# Patient Record
Sex: FEMALE | Race: WHITE | NOT HISPANIC OR LATINO | Employment: OTHER | URBAN - METROPOLITAN AREA
[De-identification: names, ages, dates, MRNs, and addresses within clinical notes are randomized per-mention and may not be internally consistent; named-entity substitution may affect disease eponyms.]

---

## 2017-06-29 ENCOUNTER — HISTORICAL (OUTPATIENT)
Dept: RADIOLOGY | Facility: HOSPITAL | Age: 77
End: 2017-06-29

## 2017-07-12 ENCOUNTER — HISTORICAL (OUTPATIENT)
Dept: RADIOLOGY | Facility: HOSPITAL | Age: 77
End: 2017-07-12

## 2017-07-13 ENCOUNTER — HISTORICAL (OUTPATIENT)
Dept: RADIOLOGY | Facility: HOSPITAL | Age: 77
End: 2017-07-13

## 2017-10-09 ENCOUNTER — HISTORICAL (OUTPATIENT)
Dept: RADIOLOGY | Facility: HOSPITAL | Age: 77
End: 2017-10-09

## 2017-11-02 ENCOUNTER — HISTORICAL (OUTPATIENT)
Dept: INFUSION THERAPY | Facility: HOSPITAL | Age: 77
End: 2017-11-02

## 2019-05-01 ENCOUNTER — HISTORICAL (OUTPATIENT)
Dept: RADIOLOGY | Facility: HOSPITAL | Age: 79
End: 2019-05-01

## 2022-04-11 ENCOUNTER — HISTORICAL (OUTPATIENT)
Dept: ADMINISTRATIVE | Facility: HOSPITAL | Age: 82
End: 2022-04-11

## 2022-04-28 VITALS
DIASTOLIC BLOOD PRESSURE: 70 MMHG | BODY MASS INDEX: 31.04 KG/M2 | OXYGEN SATURATION: 97 % | HEIGHT: 65 IN | SYSTOLIC BLOOD PRESSURE: 105 MMHG | WEIGHT: 186.31 LBS

## 2024-03-19 ENCOUNTER — LAB VISIT (OUTPATIENT)
Dept: LAB | Facility: HOSPITAL | Age: 84
End: 2024-03-19
Attending: INTERNAL MEDICINE
Payer: MEDICARE

## 2024-03-19 DIAGNOSIS — J44.9 COPD (CHRONIC OBSTRUCTIVE PULMONARY DISEASE): Primary | ICD-10-CM

## 2024-03-19 LAB
ALLENS TEST: ABNORMAL
DELSYS: ABNORMAL
HCO3 UR-SCNC: 24.4 MMOL/L (ref 24–28)
PCO2 BLDA: 40.4 MMHG (ref 35–45)
PH SMN: 7.39 [PH] (ref 7.35–7.45)
PO2 BLDA: 113 MMHG (ref 80–100)
POC BE: -1 MMOL/L
POC SATURATED O2: 98 % (ref 95–100)
POC TCO2: 26 MMOL/L (ref 23–27)
SAMPLE: ABNORMAL
SITE: ABNORMAL

## 2024-03-19 PROCEDURE — 36600 WITHDRAWAL OF ARTERIAL BLOOD: CPT

## 2024-03-19 PROCEDURE — 82803 BLOOD GASES ANY COMBINATION: CPT

## 2024-03-19 PROCEDURE — 99900035 HC TECH TIME PER 15 MIN (STAT)

## 2024-05-21 ENCOUNTER — HOSPITAL ENCOUNTER (EMERGENCY)
Facility: HOSPITAL | Age: 84
Discharge: HOME | End: 2024-05-21
Attending: INTERNAL MEDICINE
Payer: MEDICARE

## 2024-05-21 VITALS
TEMPERATURE: 98 F | DIASTOLIC BLOOD PRESSURE: 79 MMHG | HEART RATE: 56 BPM | HEIGHT: 65 IN | RESPIRATION RATE: 16 BRPM | SYSTOLIC BLOOD PRESSURE: 128 MMHG | BODY MASS INDEX: 29.99 KG/M2 | WEIGHT: 180 LBS | OXYGEN SATURATION: 98 %

## 2024-05-21 DIAGNOSIS — R07.9 CHEST PAIN, UNSPECIFIED TYPE: Primary | ICD-10-CM

## 2024-05-21 LAB
ALBUMIN SERPL-MCNC: 3.3 G/DL (ref 3.4–4.8)
ALBUMIN/GLOB SERPL: 0.8 RATIO (ref 1.1–2)
ALP SERPL-CCNC: 75 UNIT/L (ref 40–150)
ALT SERPL-CCNC: 7 UNIT/L (ref 0–55)
ANION GAP SERPL CALC-SCNC: 6 MEQ/L
AST SERPL-CCNC: 12 UNIT/L (ref 5–34)
BASOPHILS # BLD AUTO: 0.07 X10(3)/MCL
BASOPHILS NFR BLD AUTO: 1 %
BILIRUB SERPL-MCNC: 0.2 MG/DL
BNP BLD-MCNC: 133.3 PG/ML
BUN SERPL-MCNC: 14 MG/DL (ref 9.8–20.1)
CALCIUM SERPL-MCNC: 9.4 MG/DL (ref 8.4–10.2)
CHLORIDE SERPL-SCNC: 107 MMOL/L (ref 98–107)
CO2 SERPL-SCNC: 28 MMOL/L (ref 23–31)
CREAT SERPL-MCNC: 1.03 MG/DL (ref 0.55–1.02)
CREAT/UREA NIT SERPL: 14
EOSINOPHIL # BLD AUTO: 0.04 X10(3)/MCL (ref 0–0.9)
EOSINOPHIL NFR BLD AUTO: 0.6 %
ERYTHROCYTE [DISTWIDTH] IN BLOOD BY AUTOMATED COUNT: 13 % (ref 11.5–17)
GFR SERPLBLD CREATININE-BSD FMLA CKD-EPI: 55 ML/MIN/1.73/M2
GLOBULIN SER-MCNC: 4.2 GM/DL (ref 2.4–3.5)
GLUCOSE SERPL-MCNC: 115 MG/DL (ref 82–115)
HCT VFR BLD AUTO: 42.6 % (ref 37–47)
HGB BLD-MCNC: 13 G/DL (ref 12–16)
IMM GRANULOCYTES # BLD AUTO: 0.03 X10(3)/MCL (ref 0–0.04)
IMM GRANULOCYTES NFR BLD AUTO: 0.4 %
LYMPHOCYTES # BLD AUTO: 1.33 X10(3)/MCL (ref 0.6–4.6)
LYMPHOCYTES NFR BLD AUTO: 19.3 %
MCH RBC QN AUTO: 27.2 PG (ref 27–31)
MCHC RBC AUTO-ENTMCNC: 30.5 G/DL (ref 33–36)
MCV RBC AUTO: 89.1 FL (ref 80–94)
MONOCYTES # BLD AUTO: 0.63 X10(3)/MCL (ref 0.1–1.3)
MONOCYTES NFR BLD AUTO: 9.1 %
NEUTROPHILS # BLD AUTO: 4.79 X10(3)/MCL (ref 2.1–9.2)
NEUTROPHILS NFR BLD AUTO: 69.6 %
PLATELET # BLD AUTO: 282 X10(3)/MCL (ref 130–400)
PMV BLD AUTO: 12.1 FL (ref 7.4–10.4)
POTASSIUM SERPL-SCNC: 4.7 MMOL/L (ref 3.5–5.1)
PROT SERPL-MCNC: 7.5 GM/DL (ref 5.8–7.6)
RBC # BLD AUTO: 4.78 X10(6)/MCL (ref 4.2–5.4)
SODIUM SERPL-SCNC: 141 MMOL/L (ref 136–145)
TROPONIN I SERPL-MCNC: 0.01 NG/ML (ref 0–0.04)
WBC # SPEC AUTO: 6.89 X10(3)/MCL (ref 4.5–11.5)

## 2024-05-21 PROCEDURE — 93010 ELECTROCARDIOGRAM REPORT: CPT | Mod: ,,, | Performed by: INTERNAL MEDICINE

## 2024-05-21 PROCEDURE — 85025 COMPLETE CBC W/AUTO DIFF WBC: CPT | Performed by: INTERNAL MEDICINE

## 2024-05-21 PROCEDURE — 83880 ASSAY OF NATRIURETIC PEPTIDE: CPT | Performed by: INTERNAL MEDICINE

## 2024-05-21 PROCEDURE — 93005 ELECTROCARDIOGRAM TRACING: CPT

## 2024-05-21 PROCEDURE — 99285 EMERGENCY DEPT VISIT HI MDM: CPT | Mod: 25

## 2024-05-21 PROCEDURE — 80053 COMPREHEN METABOLIC PANEL: CPT | Performed by: INTERNAL MEDICINE

## 2024-05-21 PROCEDURE — 84484 ASSAY OF TROPONIN QUANT: CPT | Performed by: INTERNAL MEDICINE

## 2024-05-21 NOTE — ED PROVIDER NOTES
Encounter Date: 5/21/2024  History from patient     History     Chief Complaint   Patient presents with    Chest Pain     Chest pain that started at 11am this morning. Pt reports she took 2 SL nitro at home     KELLEE Dupont is 83 y.o. female who  has a past medical history of COPD (chronic obstructive pulmonary disease), GERD (gastroesophageal reflux disease), Hypercholesterolemia, Hypertension, Pulmonary hypertension, and Venous stasis. arrives in ER with c/o Chest Pain (Chest pain that started at 11am this morning. Pt reports she took 2 SL nitro at home)    Review of patient's allergies indicates:  No Known Allergies  Past Medical History:   Diagnosis Date    COPD (chronic obstructive pulmonary disease)     GERD (gastroesophageal reflux disease)     Hypercholesterolemia     Hypertension     Pulmonary hypertension     Venous stasis      Past Surgical History:   Procedure Laterality Date    CHOLECYSTECTOMY      HYSTERECTOMY      KNEE SURGERY       Family History   Problem Relation Name Age of Onset    Stroke Father      COPD Father      Valvular heart disease Father        Review of Systems   Constitutional:  Negative for fever.   HENT:  Negative for trouble swallowing and voice change.    Eyes:  Negative for visual disturbance.   Respiratory:  Negative for cough and shortness of breath.    Cardiovascular:  Positive for chest pain.        Off and on since morning, has had it in past also   Gastrointestinal:  Negative for abdominal pain, diarrhea and vomiting.   Genitourinary:  Negative for dysuria and hematuria.   Musculoskeletal:  Negative for back pain and gait problem.   Skin:  Negative for color change and rash.   Neurological:  Positive for dizziness. Negative for headaches.   Psychiatric/Behavioral:  Negative for behavioral problems and sleep disturbance.    All other systems reviewed and are negative.    Physical Exam     Initial Vitals [05/21/24 1455]   BP Pulse Resp Temp SpO2   (!) 158/66 63 20  97.2 °F (36.2 °C) 98 %      MAP       --         Physical Exam    Nursing note and vitals reviewed.  Constitutional: She appears well-developed and well-nourished. No distress.   HENT:   Head: Normocephalic and atraumatic.   Eyes: EOM are normal.   Neck: Neck supple.   Cardiovascular:  Normal rate and regular rhythm.           Pulmonary/Chest: Breath sounds normal. No respiratory distress. She has no wheezes. She has no rhonchi. She has no rales.   Abdominal: Abdomen is soft. Bowel sounds are normal. She exhibits no distension. There is no abdominal tenderness. There is no rebound and no guarding.   Musculoskeletal:         General: No tenderness or edema. Normal range of motion.      Cervical back: Neck supple.     Neurological: She is alert and oriented to person, place, and time.   Skin: Skin is warm and dry.   Psychiatric: She has a normal mood and affect.         ED Course   Procedures  Orders Placed This Encounter   Procedures    X-ray Chest AP Portable    Comprehensive metabolic panel    CBC auto differential    Troponin I    Brain natriuretic peptide    CBC with Differential    Diet NPO    Vital signs    Cardiac Monitoring - Adult    Oxygen Continuous    Pulse Oximetry Continuous    EKG 12-LEAD    Insert saline lock     Medications - No data to display  Admission on 05/21/2024   Component Date Value Ref Range Status    Sodium 05/21/2024 141  136 - 145 mmol/L Final    Potassium 05/21/2024 4.7  3.5 - 5.1 mmol/L Final    Chloride 05/21/2024 107  98 - 107 mmol/L Final    CO2 05/21/2024 28  23 - 31 mmol/L Final    Glucose 05/21/2024 115  82 - 115 mg/dL Final    Blood Urea Nitrogen 05/21/2024 14.0  9.8 - 20.1 mg/dL Final    Creatinine 05/21/2024 1.03 (H)  0.55 - 1.02 mg/dL Final    Calcium 05/21/2024 9.4  8.4 - 10.2 mg/dL Final    Protein Total 05/21/2024 7.5  5.8 - 7.6 gm/dL Final    Albumin 05/21/2024 3.3 (L)  3.4 - 4.8 g/dL Final    Globulin 05/21/2024 4.2 (H)  2.4 - 3.5 gm/dL Final    Albumin/Globulin Ratio  05/21/2024 0.8 (L)  1.1 - 2.0 ratio Final    Bilirubin Total 05/21/2024 0.2  <=1.5 mg/dL Final    ALP 05/21/2024 75  40 - 150 unit/L Final    ALT 05/21/2024 7  0 - 55 unit/L Final    AST 05/21/2024 12  5 - 34 unit/L Final    eGFR 05/21/2024 55  mL/min/1.73/m2 Final    Anion Gap 05/21/2024 6.0  mEq/L Final    BUN/Creatinine Ratio 05/21/2024 14   Final    Troponin-I 05/21/2024 0.015  0.000 - 0.045 ng/mL Final    Natriuretic Peptide 05/21/2024 133.3 (H)  <=100.0 pg/mL Final    WBC 05/21/2024 6.89  4.50 - 11.50 x10(3)/mcL Final    RBC 05/21/2024 4.78  4.20 - 5.40 x10(6)/mcL Final    Hgb 05/21/2024 13.0  12.0 - 16.0 g/dL Final    Hct 05/21/2024 42.6  37.0 - 47.0 % Final    MCV 05/21/2024 89.1  80.0 - 94.0 fL Final    MCH 05/21/2024 27.2  27.0 - 31.0 pg Final    MCHC 05/21/2024 30.5 (L)  33.0 - 36.0 g/dL Final    RDW 05/21/2024 13.0  11.5 - 17.0 % Final    Platelet 05/21/2024 282  130 - 400 x10(3)/mcL Final    MPV 05/21/2024 12.1 (H)  7.4 - 10.4 fL Final    Neut % 05/21/2024 69.6  % Final    Lymph % 05/21/2024 19.3  % Final    Mono % 05/21/2024 9.1  % Final    Eos % 05/21/2024 0.6  % Final    Basophil % 05/21/2024 1.0  % Final    Lymph # 05/21/2024 1.33  0.6 - 4.6 x10(3)/mcL Final    Neut # 05/21/2024 4.79  2.1 - 9.2 x10(3)/mcL Final    Mono # 05/21/2024 0.63  0.1 - 1.3 x10(3)/mcL Final    Eos # 05/21/2024 0.04  0 - 0.9 x10(3)/mcL Final    Baso # 05/21/2024 0.07  <=0.2 x10(3)/mcL Final    IG# 05/21/2024 0.03  0 - 0.04 x10(3)/mcL Final    IG% 05/21/2024 0.4  % Final       Labs Reviewed   COMPREHENSIVE METABOLIC PANEL - Abnormal; Notable for the following components:       Result Value    Creatinine 1.03 (*)     Albumin 3.3 (*)     Globulin 4.2 (*)     Albumin/Globulin Ratio 0.8 (*)     All other components within normal limits   B-TYPE NATRIURETIC PEPTIDE - Abnormal; Notable for the following components:    Natriuretic Peptide 133.3 (*)     All other components within normal limits   CBC WITH DIFFERENTIAL - Abnormal;  Notable for the following components:    MCHC 30.5 (*)     MPV 12.1 (*)     All other components within normal limits   TROPONIN I - Normal   CBC W/ AUTO DIFFERENTIAL    Narrative:     The following orders were created for panel order CBC auto differential.  Procedure                               Abnormality         Status                     ---------                               -----------         ------                     CBC with Differential[[id number]]       Abnormal            Final result                 Please view results for these tests on the individual orders.        ECG Results              EKG 12-LEAD (Preliminary result)  Result time 05/21/24 15:44:18      Wet Read by Simon Larwence MD (05/21/24 15:44:18, [facility] - Emergency Dept, Emergency Medicine)    EKG Initial Reading: Independently Interpreted by Simon Lawrence MD. independently as: No STEMI. Rhythm: Normal Sinus Rhythm, Rate 58. Ectopy: No Ectopy. Conduction: Normal. ST Segments: Normal ST Segments. T Waves: Normal. Axis: Normal.  Sinus bradycardia                                  Imaging Results              X-ray Chest AP Portable (Final result)  Result time 05/21/24 16:30:27      Final result by Shan Frazier MD (05/21/24 16:30:27)                   Impression:      1. Borderline cardiomegaly  2. Atherosclerosis  3. Mild hazy interstitial opacities again evident at the lower lungs bilaterally suggesting a chronic process.  A superimposed acute component cannot be entirely excluded.  Clinical correlation is indicated  4. Thoracic spondylosis      Electronically signed by: Shan Frazier  Date:    05/21/2024  Time:    16:30               Narrative:    EXAMINATION:  XR CHEST AP PORTABLE    CLINICAL HISTORY:  Chest Pain;, .    COMPARISON:  09/11/2021    FINDINGS:  An AP view or more reveals the heart to be borderline enlarged.  The trachea is midline.  Atherosclerosis is seen within the aorta.  Mild increased  interstitial markings evident the lower lungs bilaterally which have a similar appearance to the prior exam.  Bony structures are osteopenic.                        Wet Read by Simon Lawrence MD (05/21/24 15:44:09, [facility] - Emergency Dept, Emergency Medicine)    Chest One View:  Independently reviewed and/or interpreted by Simon Lawrence MD.  No Focal Consolidation, No Acute Cardiopulmonary abnormality identified grossly.                      Wet Read by Simon Lawrence MD (05/21/24 15:09:50, [facility] - Emergency Dept, Emergency Medicine)    EKG Initial Reading: Independently Interpreted by Simon Lawrence MD. independently as: No STEMI. Rhythm: Normal Sinus Rhythm, Rate 58. Ectopy: No Ectopy. Conduction: Normal. ST Segments: Normal ST Segments. T Waves: Normal. Axis: Normal.  Sinus bradycardia                                     Medications - No data to display  Medical Decision Making    Tracey Dupont is 83 y.o. female who  has a past medical history of COPD (chronic obstructive pulmonary disease), GERD (gastroesophageal reflux disease), Hypercholesterolemia, Hypertension, Pulmonary hypertension, and Venous stasis. arrives in ER with c/o Chest Pain (Chest pain that started at 11am this morning. Pt reports she took 2 SL nitro at home)    Patient essentially comes to the emergency room with complaint of chest pain which she woke up with this morning, she says it is stinging pain to the left side of the chest, and then she felt it in the arm also, she sat home for some time but it was coming and going so she decided to call the ambulance, and they recommended for her to come to the emergency room to get checked.  The EKG on the ambulance did not show any acute changes, EKG on arrival in the emergency room also does not show any acute STEMI, I am going to do a cardiac workup on her, she does have history of pulmonary hypertension, COPD, she is on home oxygen.  She says she uses  oxygen when she is ambulating, usually at rest her oxygen level comes up.        Amount and/or Complexity of Data Reviewed  Labs: ordered.  Radiology: ordered and independent interpretation performed.               ED Course as of 05/21/24 1656   Tue May 21, 2024   1555 Patient says she is chest pain-free at this time, denies any complaints whatsoever at this time, she says she just wanted to get checked out. [GQ]   1651 Patient's workup overall is okay, I talked to patient patient remains chest pain-free, I advised her that she can call her cardiologist tomorrow morning and go see them to do a stress test, patient says that they did the workup for weeks back on her and they have not called back, I advised her that she should talk to the cardiologist and let them know that she had to go to the emergency room with chest pain and they can look into that further, patient verbalized understanding I advised her that she should come back to the emergency room in case she develops chest pain again she verbalized understanding I will let her go home. [GQ]      ED Course User Index  [GQ] Simon Lawrence MD                           Clinical Impression:  Final diagnoses:  [R07.9] Chest pain, unspecified type (Primary)          ED Disposition Condition    Discharge Stable          ED Prescriptions    None       Follow-up Information       Follow up With Specialties Details Why Contact Info    Yogi Hoff MD Internal Medicine In 2 days  [address] [place] [id number]  [phone]      Michael Miller MD Cardiology   [address] [place] [address].  [place] [address]  [phone]               Simon Lawrence MD  05/21/24 1656

## 2024-05-22 LAB
OHS QRS DURATION: 76 MS
OHS QTC CALCULATION: 392 MS

## 2024-07-03 ENCOUNTER — HOSPITAL ENCOUNTER (EMERGENCY)
Facility: HOSPITAL | Age: 84
Discharge: HOME | End: 2024-07-03
Attending: EMERGENCY MEDICINE
Payer: MEDICARE

## 2024-07-03 VITALS
WEIGHT: 212 LBS | HEART RATE: 68 BPM | OXYGEN SATURATION: 98 % | HEIGHT: 65 IN | RESPIRATION RATE: 17 BRPM | DIASTOLIC BLOOD PRESSURE: 66 MMHG | BODY MASS INDEX: 35.32 KG/M2 | TEMPERATURE: 98 F | SYSTOLIC BLOOD PRESSURE: 125 MMHG

## 2024-07-03 DIAGNOSIS — R07.9 CHEST PAIN: ICD-10-CM

## 2024-07-03 LAB
ANION GAP SERPL CALC-SCNC: 7 MEQ/L
BACTERIA #/AREA URNS AUTO: ABNORMAL /HPF
BASOPHILS # BLD AUTO: 0.07 X10(3)/MCL
BASOPHILS NFR BLD AUTO: 0.9 %
BILIRUB UR QL STRIP.AUTO: NEGATIVE
BNP BLD-MCNC: 97.3 PG/ML
BUN SERPL-MCNC: 13 MG/DL (ref 9.8–20.1)
CALCIUM SERPL-MCNC: 9.5 MG/DL (ref 8.4–10.2)
CHLORIDE SERPL-SCNC: 108 MMOL/L (ref 98–107)
CLARITY UR: CLEAR
CO2 SERPL-SCNC: 27 MMOL/L (ref 23–31)
COLOR UR AUTO: YELLOW
CREAT SERPL-MCNC: 0.89 MG/DL (ref 0.55–1.02)
CREAT/UREA NIT SERPL: 15
EOSINOPHIL # BLD AUTO: 0.08 X10(3)/MCL (ref 0–0.9)
EOSINOPHIL NFR BLD AUTO: 1.1 %
ERYTHROCYTE [DISTWIDTH] IN BLOOD BY AUTOMATED COUNT: 13.2 % (ref 11.5–17)
GFR SERPLBLD CREATININE-BSD FMLA CKD-EPI: >60 ML/MIN/1.73/M2
GLUCOSE SERPL-MCNC: 111 MG/DL (ref 82–115)
GLUCOSE UR QL STRIP: NEGATIVE
HCT VFR BLD AUTO: 39.2 % (ref 37–47)
HGB BLD-MCNC: 12.1 G/DL (ref 12–16)
HGB UR QL STRIP: NEGATIVE
IMM GRANULOCYTES # BLD AUTO: 0.05 X10(3)/MCL (ref 0–0.04)
IMM GRANULOCYTES NFR BLD AUTO: 0.7 %
KETONES UR QL STRIP: NEGATIVE
LEUKOCYTE ESTERASE UR QL STRIP: ABNORMAL
LIPASE SERPL-CCNC: 25 U/L
LYMPHOCYTES # BLD AUTO: 1.85 X10(3)/MCL (ref 0.6–4.6)
LYMPHOCYTES NFR BLD AUTO: 24.5 %
MAGNESIUM SERPL-MCNC: 2.1 MG/DL (ref 1.6–2.6)
MCH RBC QN AUTO: 27.4 PG (ref 27–31)
MCHC RBC AUTO-ENTMCNC: 30.9 G/DL (ref 33–36)
MCV RBC AUTO: 88.7 FL (ref 80–94)
MONOCYTES # BLD AUTO: 0.63 X10(3)/MCL (ref 0.1–1.3)
MONOCYTES NFR BLD AUTO: 8.3 %
NEUTROPHILS # BLD AUTO: 4.87 X10(3)/MCL (ref 2.1–9.2)
NEUTROPHILS NFR BLD AUTO: 64.5 %
NITRITE UR QL STRIP: NEGATIVE
OHS QRS DURATION: 70 MS
OHS QTC CALCULATION: 388 MS
PH UR STRIP: 6 [PH]
PLATELET # BLD AUTO: 241 X10(3)/MCL (ref 130–400)
PMV BLD AUTO: 12.4 FL (ref 7.4–10.4)
POTASSIUM SERPL-SCNC: 4.7 MMOL/L (ref 3.5–5.1)
PROT UR QL STRIP: NEGATIVE
RBC # BLD AUTO: 4.42 X10(6)/MCL (ref 4.2–5.4)
RBC #/AREA URNS AUTO: ABNORMAL /HPF
SODIUM SERPL-SCNC: 142 MMOL/L (ref 136–145)
SP GR UR STRIP.AUTO: 1.01 (ref 1–1.03)
SQUAMOUS #/AREA URNS AUTO: ABNORMAL /HPF
TROPONIN I SERPL-MCNC: 0.01 NG/ML (ref 0–0.04)
TROPONIN I SERPL-MCNC: 0.01 NG/ML (ref 0–0.04)
UROBILINOGEN UR STRIP-ACNC: 0.2
WBC # BLD AUTO: 7.55 X10(3)/MCL (ref 4.5–11.5)
WBC #/AREA URNS AUTO: ABNORMAL /HPF

## 2024-07-03 PROCEDURE — 83880 ASSAY OF NATRIURETIC PEPTIDE: CPT | Performed by: EMERGENCY MEDICINE

## 2024-07-03 PROCEDURE — 93010 ELECTROCARDIOGRAM REPORT: CPT | Mod: ,,, | Performed by: INTERNAL MEDICINE

## 2024-07-03 PROCEDURE — 83735 ASSAY OF MAGNESIUM: CPT | Performed by: EMERGENCY MEDICINE

## 2024-07-03 PROCEDURE — 80048 BASIC METABOLIC PNL TOTAL CA: CPT | Performed by: EMERGENCY MEDICINE

## 2024-07-03 PROCEDURE — 81003 URINALYSIS AUTO W/O SCOPE: CPT | Performed by: EMERGENCY MEDICINE

## 2024-07-03 PROCEDURE — 85025 COMPLETE CBC W/AUTO DIFF WBC: CPT | Performed by: EMERGENCY MEDICINE

## 2024-07-03 PROCEDURE — 84484 ASSAY OF TROPONIN QUANT: CPT | Performed by: EMERGENCY MEDICINE

## 2024-07-03 PROCEDURE — 83690 ASSAY OF LIPASE: CPT | Performed by: EMERGENCY MEDICINE

## 2024-07-03 PROCEDURE — 93005 ELECTROCARDIOGRAM TRACING: CPT

## 2024-07-03 PROCEDURE — 99285 EMERGENCY DEPT VISIT HI MDM: CPT | Mod: 25

## 2025-02-17 ENCOUNTER — HOSPITAL ENCOUNTER (EMERGENCY)
Facility: HOSPITAL | Age: 85
Discharge: HOME | End: 2025-02-17
Attending: INTERNAL MEDICINE
Payer: MEDICARE

## 2025-02-17 VITALS
BODY MASS INDEX: 35.32 KG/M2 | TEMPERATURE: 97 F | DIASTOLIC BLOOD PRESSURE: 63 MMHG | HEIGHT: 65 IN | HEART RATE: 52 BPM | WEIGHT: 212 LBS | OXYGEN SATURATION: 99 % | RESPIRATION RATE: 18 BRPM | SYSTOLIC BLOOD PRESSURE: 115 MMHG

## 2025-02-17 DIAGNOSIS — J44.9 END STAGE COPD: ICD-10-CM

## 2025-02-17 DIAGNOSIS — Z99.81 OXYGEN DEPENDENT: ICD-10-CM

## 2025-02-17 DIAGNOSIS — R07.9 CHEST PAIN: Primary | ICD-10-CM

## 2025-02-17 LAB
ALBUMIN SERPL-MCNC: 3.5 G/DL (ref 3.4–4.8)
ALBUMIN/GLOB SERPL: 0.8 RATIO (ref 1.1–2)
ALP SERPL-CCNC: 90 UNIT/L (ref 40–150)
ALT SERPL-CCNC: 9 UNIT/L (ref 0–55)
ANION GAP SERPL CALC-SCNC: 10 MEQ/L
AST SERPL-CCNC: 14 UNIT/L (ref 5–34)
BASOPHILS # BLD AUTO: 0.08 X10(3)/MCL
BASOPHILS NFR BLD AUTO: 1 %
BILIRUB SERPL-MCNC: 0.4 MG/DL
BNP BLD-MCNC: 91.4 PG/ML
BUN SERPL-MCNC: 17 MG/DL (ref 9.8–20.1)
CALCIUM SERPL-MCNC: 9.4 MG/DL (ref 8.4–10.2)
CHLORIDE SERPL-SCNC: 106 MMOL/L (ref 98–107)
CO2 SERPL-SCNC: 25 MMOL/L (ref 23–31)
CREAT SERPL-MCNC: 1.08 MG/DL (ref 0.55–1.02)
CREAT/UREA NIT SERPL: 16
EOSINOPHIL # BLD AUTO: 0.05 X10(3)/MCL (ref 0–0.9)
EOSINOPHIL NFR BLD AUTO: 0.6 %
ERYTHROCYTE [DISTWIDTH] IN BLOOD BY AUTOMATED COUNT: 13.3 % (ref 11.5–17)
GFR SERPLBLD CREATININE-BSD FMLA CKD-EPI: 51 ML/MIN/1.73/M2
GLOBULIN SER-MCNC: 4.4 GM/DL (ref 2.4–3.5)
GLUCOSE SERPL-MCNC: 118 MG/DL (ref 82–115)
HCT VFR BLD AUTO: 44.1 % (ref 37–47)
HGB BLD-MCNC: 13.6 G/DL (ref 12–16)
IMM GRANULOCYTES # BLD AUTO: 0.02 X10(3)/MCL (ref 0–0.04)
IMM GRANULOCYTES NFR BLD AUTO: 0.3 %
LYMPHOCYTES # BLD AUTO: 1.94 X10(3)/MCL (ref 0.6–4.6)
LYMPHOCYTES NFR BLD AUTO: 25.1 %
MCH RBC QN AUTO: 27.6 PG (ref 27–31)
MCHC RBC AUTO-ENTMCNC: 30.8 G/DL (ref 33–36)
MCV RBC AUTO: 89.5 FL (ref 80–94)
MONOCYTES # BLD AUTO: 0.66 X10(3)/MCL (ref 0.1–1.3)
MONOCYTES NFR BLD AUTO: 8.5 %
NEUTROPHILS # BLD AUTO: 4.98 X10(3)/MCL (ref 2.1–9.2)
NEUTROPHILS NFR BLD AUTO: 64.5 %
NRBC BLD AUTO-RTO: 0 %
OHS QRS DURATION: 70 MS
OHS QTC CALCULATION: 398 MS
PLATELET # BLD AUTO: 262 X10(3)/MCL (ref 130–400)
PMV BLD AUTO: 12.4 FL (ref 7.4–10.4)
POTASSIUM SERPL-SCNC: 4.5 MMOL/L (ref 3.5–5.1)
PROT SERPL-MCNC: 7.9 GM/DL (ref 5.8–7.6)
RBC # BLD AUTO: 4.93 X10(6)/MCL (ref 4.2–5.4)
SODIUM SERPL-SCNC: 141 MMOL/L (ref 136–145)
TROPONIN I SERPL-MCNC: <0.01 NG/ML (ref 0–0.04)
WBC # BLD AUTO: 7.73 X10(3)/MCL (ref 4.5–11.5)

## 2025-02-17 PROCEDURE — 80053 COMPREHEN METABOLIC PANEL: CPT | Performed by: INTERNAL MEDICINE

## 2025-02-17 PROCEDURE — 84484 ASSAY OF TROPONIN QUANT: CPT | Performed by: INTERNAL MEDICINE

## 2025-02-17 PROCEDURE — 93005 ELECTROCARDIOGRAM TRACING: CPT

## 2025-02-17 PROCEDURE — 99285 EMERGENCY DEPT VISIT HI MDM: CPT | Mod: 25

## 2025-02-17 PROCEDURE — 85025 COMPLETE CBC W/AUTO DIFF WBC: CPT | Performed by: INTERNAL MEDICINE

## 2025-02-17 PROCEDURE — 83880 ASSAY OF NATRIURETIC PEPTIDE: CPT | Performed by: INTERNAL MEDICINE

## 2025-02-17 NOTE — ED PROVIDER NOTES
Encounter Date: 2/17/2025       History     Chief Complaint   Patient presents with    Chest Pain     C/o mid chest pains with left jaw numbness started at noon today, states took 1 SL nitro at home without relief     84-year-old female with a past medical history of COPD, CAD, pulmonary hypertension, high cholesterol and hypertension who presents for chest pain.  Patient reports she has chronic chest pain and shortness of breath that will come and go for several years.  She has been noticing for the past couple of days pain on the left side of her chest that does feel similar but she began noticing some pain in her left jaw and radiation to the left arm.  She states this does feel similar to prior heart attacks, however she has had this multiple times and has multiple evaluations with a negative workup.  She denies any changes in medications or trauma.  She denies fevers, chills, nausea, vomiting, abdominal pain, diarrhea, extremity swelling, extremity weakness, dizziness, lightheadedness, cough, rhinorrhea, sore throat, headache, visual changes, numbness/tingling, focal deficits    The history is provided by the patient and a relative.     Review of patient's allergies indicates:  No Known Allergies  Past Medical History:   Diagnosis Date    COPD (chronic obstructive pulmonary disease)     GERD (gastroesophageal reflux disease)     Hypercholesterolemia     Hypertension     Pulmonary hypertension     Venous stasis      Past Surgical History:   Procedure Laterality Date    CHOLECYSTECTOMY      HYSTERECTOMY      KNEE SURGERY       Family History   Problem Relation Name Age of Onset    Stroke Father      COPD Father      Valvular heart disease Father       Social History[1]  Review of Systems   All other systems reviewed and are negative.      Physical Exam     Initial Vitals [02/17/25 1633]   BP Pulse Resp Temp SpO2   (!) 147/96 84 18 96.6 °F (35.9 °C) 99 %      MAP       --         Physical Exam    Constitutional:  She appears well-developed and well-nourished. She is not diaphoretic. She is Obese . She is cooperative.  Non-toxic appearance. She appears ill (Chronically).   HENT:   Head: Normocephalic and atraumatic. Mouth/Throat: Uvula is midline, oropharynx is clear and moist and mucous membranes are normal.   Eyes: Conjunctivae and EOM are normal. Pupils are equal, round, and reactive to light.   Neck: Trachea normal and phonation normal. Neck supple.    Full passive range of motion without pain.     Cardiovascular:  Normal rate, regular rhythm, normal heart sounds, intact distal pulses and normal pulses.           No murmur heard.  Pulmonary/Chest: No accessory muscle usage. No tachypnea. No respiratory distress. She has no decreased breath sounds. She has no wheezes. She has no rhonchi. She has no rales.   Tenderness to palpation over left lower anterior chest wall.  No rashes, skin changes, crepitus or edema.   Abdominal: Abdomen is soft. Bowel sounds are normal. She exhibits no distension. There is no abdominal tenderness. No hernia. There is no rebound and no guarding.   Musculoskeletal:      Cervical back: Full passive range of motion without pain and neck supple.     Neurological: She is alert and oriented to person, place, and time. She has normal strength. No sensory deficit. GCS eye subscore is 4. GCS verbal subscore is 5. GCS motor subscore is 6.   Skin: Skin is warm, dry and intact. Capillary refill takes less than 2 seconds. No rash noted.   Psychiatric: She has a normal mood and affect. Her speech is normal and behavior is normal. Thought content normal.         ED Course   Procedures  Labs Reviewed   COMPREHENSIVE METABOLIC PANEL - Abnormal       Result Value    Sodium 141      Potassium 4.5      Chloride 106      CO2 25      Glucose 118 (*)     Blood Urea Nitrogen 17.0      Creatinine 1.08 (*)     Calcium 9.4      Protein Total 7.9 (*)     Albumin 3.5      Globulin 4.4 (*)     Albumin/Globulin Ratio 0.8 (*)      Bilirubin Total 0.4      ALP 90      ALT 9      AST 14      eGFR 51      Anion Gap 10.0      BUN/Creatinine Ratio 16     CBC WITH DIFFERENTIAL - Abnormal    WBC 7.73      RBC 4.93      Hgb 13.6      Hct 44.1      MCV 89.5      MCH 27.6      MCHC 30.8 (*)     RDW 13.3      Platelet 262      MPV 12.4 (*)     Neut % 64.5      Lymph % 25.1      Mono % 8.5      Eos % 0.6      Basophil % 1.0      Imm Grans % 0.3      Neut # 4.98      Lymph # 1.94      Mono # 0.66      Eos # 0.05      Baso # 0.08      Imm Gran # 0.02      NRBC% 0.0     TROPONIN I - Normal    Troponin-I <0.010     B-TYPE NATRIURETIC PEPTIDE - Normal    Natriuretic Peptide 91.4     CBC W/ AUTO DIFFERENTIAL    Narrative:     The following orders were created for panel order CBC auto differential.  Procedure                               Abnormality         Status                     ---------                               -----------         ------                     CBC with Differential[[id number]]       Abnormal            Final result                 Please view results for these tests on the individual orders.     EKG Readings: (Independently Interpreted)   Normal sinus rhythm, nonspecific ST changes but no ischemic changes or arrhythmia.  No changes when compared to EKG in February of 2024.       ECG Results              EKG 12-lead (In process)        Collection Time Result Time QRS Duration [facility] QTC Calculation    02/17/25 16:31:02 02/17/25 17:18:25 70 398                     In process by Interface, [facility] (02/17/25 17:18:27)                   Narrative:    Test Reason : R07.9,    Vent. Rate :  61 BPM     Atrial Rate :  61 BPM     P-R Int : 134 ms          QRS Dur :  70 ms      QT Int : 396 ms       P-R-T Axes :  87  64  83 degrees    QTcB Int : 398 ms    Normal sinus rhythm  Nonspecific ST abnormality  Abnormal ECG  When compared with ECG of 03-Jul-2024 06:39,  No significant change was found    Referred By: AAAREFERRAL SELF            Confirmed By:                                   Imaging Results              X-Ray Chest AP Portable (Final result)  Result time 02/17/25 18:24:51      Final result by Oskar Mohan MD (02/17/25 18:24:51)                   Impression:      Chronic interstitial changes are identified.  There may be minimal superimposed infiltrate in the lung bases.      Electronically signed by: Oskar Mohan  Date:    02/17/2025  Time:    18:24               Narrative:    EXAMINATION:  XR CHEST AP PORTABLE    CLINICAL HISTORY:  Chest Pain;    TECHNIQUE:  Single frontal view of the chest was performed.    COMPARISON:  July 3, 2024    FINDINGS:  The cardiomediastinal silhouette remains unchanged.  Mild interstitial opacities are identified in the lung bases likely representing chronic changes of mild superimposed infiltrate is not excluded.  No airspace consolidations are seen.  No pleural effusion or pneumothorax are noted.                                       Medications - No data to display  Medical Decision Making  84-year-old female presenting with chest pain.  Vital signs stable, afebrile.    Differential Diagnosis:   Judging by the patient's chief complaint and pertinent history, the patient has the following possible differential diagnoses, including but not limited to the following.  Some of these are deemed to be lower likelihood and some more likely based on my physical exam and history combined with possible lab work and/or imaging studies.   Please see the pertinent studies, and refer to the HPI.  Some of these diagnoses will take further evaluation to fully rule out, perhaps as an outpatient and the patient was encouraged to follow up when discharged for more comprehensive evaluation    Chest pain emergent diagnoses: ACS, PE, dissection, cardiac tamponade, tension pneumothorax.  Chest pain non-emergent diagnoses: Musculoskeletal, trauma, pleurisy, pneumonia, pleural effusion, GERD, other GI, neurologic,  "psychiatric and other non emergent diagnoses considered.    Amount and/or Complexity of Data Reviewed  Independent Historian: caregiver  External Data Reviewed: labs, radiology, ECG and notes.  Labs: ordered. Decision-making details documented in ED Course.  Radiology: ordered and independent interpretation performed. Decision-making details documented in ED Course.  ECG/medicine tests: ordered and independent interpretation performed.     Details: Normal sinus rhythm, nonspecific ST changes but no ischemic changes or arrhythmia.  No changes when compared to EKG in February of 2024.               ED Course as of 02/17/25 1900   Mon Feb 17, 2025   1858 QUENTIN BLANC MD, assumed care at 1800.  Agree with above care plan and documentation.  Patient seen and examined.  She has been chest pain-free the entire time in the emergency department.  She does have a known history of GERD and known end-stage COPD and coronary artery disease.  She was seeing Dr. Grimes to however there was some financial issues  and she has been lost to follow up.  I will have her follow up with me in clinic later this week and attempt to referred to a new cardiologist.  Her troponin is negative her BNP is negative and she is chest pain-free at this time [PL]      ED Course User Index  [PL] Yogi Hoff MD                           Clinical Impression:  Final diagnoses:  [R07.9] Chest pain (Primary)  [J44.9] End stage COPD  [Z99.81] Oxygen dependent          ED Disposition Condition    Discharge Stable          ED Prescriptions    None       Follow-up Information       Follow up With Specialties Details Why Contact Info    Yogi Hoff MD Internal Medicine In 3 days  [address] [place] [address]  [phone]            Portions of this note have been created with voice recognition software. Occasional "wrong-words" or "sound alike" substitutions may have occurred due to inherent limitations of voice " software. Please read the note carefully and recognize, using context, word substitutions may have occurred.           [1]   Social History  Tobacco Use    Smoking status: Former     Types: Cigarettes    Smokeless tobacco: Never        Yogi Hoff MD  02/17/25 3380